# Patient Record
Sex: MALE | Race: WHITE | NOT HISPANIC OR LATINO | Employment: FULL TIME | URBAN - METROPOLITAN AREA
[De-identification: names, ages, dates, MRNs, and addresses within clinical notes are randomized per-mention and may not be internally consistent; named-entity substitution may affect disease eponyms.]

---

## 2018-11-23 ENCOUNTER — HOSPITAL ENCOUNTER (EMERGENCY)
Facility: HOSPITAL | Age: 48
Discharge: HOME | End: 2018-11-23
Attending: EMERGENCY MEDICINE | Admitting: EMERGENCY MEDICINE

## 2018-11-23 VITALS
SYSTOLIC BLOOD PRESSURE: 122 MMHG | OXYGEN SATURATION: 98 % | DIASTOLIC BLOOD PRESSURE: 83 MMHG | RESPIRATION RATE: 18 BRPM | WEIGHT: 219 LBS | TEMPERATURE: 98.7 F | HEIGHT: 70 IN | HEART RATE: 57 BPM | BODY MASS INDEX: 31.35 KG/M2

## 2018-11-23 DIAGNOSIS — T78.40XA ALLERGIC REACTION TO DRUG, INITIAL ENCOUNTER: Primary | ICD-10-CM

## 2018-11-23 PROCEDURE — 99283 EMERGENCY DEPT VISIT LOW MDM: CPT

## 2018-11-23 PROCEDURE — 99282 EMERGENCY DEPT VISIT SF MDM: CPT | Performed by: EMERGENCY MEDICINE

## 2018-11-23 PROCEDURE — 94640 AIRWAY INHALATION TREATMENT: CPT

## 2018-11-23 RX ADMIN — RACEPINEPHRINE HYDROCHLORIDE 0.5 ML: 11.25 SOLUTION RESPIRATORY (INHALATION) at 13:35

## 2018-11-23 NOTE — ED PROVIDER NOTES
Subjective   History of Present Illness  History of Present Illness    Chief complaint: Allergic reaction    Location: Systemic    Quality/Severity:  Moderate    Timing/Duration: Symptoms started 20 minutes after taking Aleve one hour ago    Modifying Factors: Previous mild hand swelling after taking naproxen    Associated Symptoms: Wheezing and shortness of breath    Narrative: The patient is a 48-year-old white male who presents as noted above.  The patient states that once previously he did have a mild reaction with naproxen.  Unbeknownst to the patient Aleve and naproxen are the same drug.  Proximally 20 minutes after taking the Aleve he began to notice pruritus of his hands and feet followed by severe total body hives.  Wheezing, shortness of breath and lip swelling started.  Patient now feeling much better.  The patient did take 50 mg of Benadryl at home.    Review of Systems   Respiratory: Positive for shortness of breath and wheezing (Possible mild). Negative for stridor.    Skin: Positive for rash.   All other systems reviewed and are negative.      History reviewed. No pertinent past medical history.    No Known Allergies    Past Surgical History:   Procedure Laterality Date   • MANDIBLE FRACTURE SURGERY         History reviewed. No pertinent family history.    Social History     Socioeconomic History   • Marital status:      Spouse name: Not on file   • Number of children: Not on file   • Years of education: Not on file   • Highest education level: Not on file   Tobacco Use   • Smoking status: Never Smoker   • Smokeless tobacco: Never Used   Substance and Sexual Activity   • Alcohol use: Yes     Comment: a couple of drinks per month    • Drug use: No           Objective   Physical Exam   Constitutional: He is oriented to person, place, and time.   The patient is a 48-year-old white male who presents with diffuse erythema and hives.  No acute distress.   HENT:   Head: Normocephalic and atraumatic.    Eyes: Conjunctivae and EOM are normal.   Cardiovascular: Normal rate, regular rhythm and normal heart sounds.   Pulmonary/Chest: Effort normal and breath sounds normal. No stridor. No respiratory distress. He has no wheezes.   Neurological: He is alert and oriented to person, place, and time.   Skin:   Diffuse erythema with scattered hives.   Psychiatric: He has a normal mood and affect. His behavior is normal.   Nursing note and vitals reviewed.      Procedures           ED Course  ED Course as of Nov 23 1822 Fri Nov 23, 2018   1514 Hives and erythema now resolved.  Patient states back to baseline.  Patient will be discharged and advised to never take Aleve, Naprosyn or naproxen.  [ML]      ED Course User Index  [ML] Aashish Schaefer MD                  MDM  Number of Diagnoses or Management Options  Allergic reaction to drug, initial encounter: new and requires workup  Risk of Complications, Morbidity, and/or Mortality  Presenting problems: high  Management options: moderate    Patient Progress  Patient progress: improved        Final diagnoses:   Allergic reaction to drug, initial encounter         Final diagnoses:   Allergic reaction to drug, initial encounter          Aashish Schaefer MD  11/23/18 5046